# Patient Record
(demographics unavailable — no encounter records)

---

## 2024-11-26 NOTE — BEGINNING OF VISIT
[Mother] : mother [] :  [[employer] ] : provided by [employer]   [Time Spent: ____ minutes] : Total time spent using  services: [unfilled] minutes. The patient's primary language is not English thus required  services. [TWNoteComboBox1] : [place]

## 2024-11-26 NOTE — PHYSICAL EXAM
[Alert] : alert [Normocephalic] : normocephalic [Flat Open Anterior [place]] : flat open anterior fontanelle [Red Reflex] : red reflex bilateral [PERRL] : PERRL [Normally Placed Ears] : normally placed ears [Auricles Well Formed] : auricles well formed [Clear Tympanic membranes] : clear tympanic membranes [Light reflex present] : light reflex present [Bony landmarks visible] : bony landmarks visible [Nares Patent] : nares patent [Palate Intact] : palate intact [Uvula Midline] : uvula midline [Supple, full passive range of motion] : supple, full passive range of motion [Symmetric Chest Rise] : symmetric chest rise [Clear to Auscultation Bilaterally] : clear to auscultation bilaterally [Regular Rate and Rhythm] : regular rate and rhythm [S1, S2 present] : S1, S2 present [+2 Femoral Pulses] : (+) 2 femoral pulses [Soft] : soft [Bowel Sounds] : bowel sounds present [Normal External Genitalia] : normal external genitalia [Normal Vaginal Introitus] : normal vaginal introitus [Patent] : patent [Normally Placed] : normally placed [No Abnormal Lymph Nodes Palpated] : no abnormal lymph nodes palpated [Symmetric Buttocks Creases] : symmetric buttocks creases [Plantar Grasp] : plantar grasp reflex present [Cranial Nerves Grossly Intact] : cranial nerves grossly intact [Acute Distress] : no acute distress [Discharge] : no discharge [Tooth Eruption] : no tooth eruption [Palpable Masses] : no palpable masses [Murmurs] : no murmurs [Tender] : nontender [Distended] : nondistended [Hepatomegaly] : no hepatomegaly [Splenomegaly] : no splenomegaly [Clitoromegaly] : no clitoromegaly [Garcia-Ortolani] : negative Garcia-Ortolani [Allis Sign] : negative Allis sign [Spinal Dimple] : no spinal dimple [Tuft of Hair] : no tuft of hair [Rash or Lesions] : no rash/lesions

## 2024-11-26 NOTE — COUNSELING
[Use of Plain Language] : use of plain language [Adequate] : adequate [None] : none [] : I have reviewed management goals with caretaker and provided a copy of care plan [FreeTextEntry3] :  used for appt

## 2024-11-26 NOTE — DEVELOPMENTAL MILESTONES
[Normal Development] : Normal Development [None] : none [Pats or smiles at reflection] : pats or smiles at reflection [Begins to turn when name called] : begins to turn when name called [Babbles] : babbles [Rolls over prone to supine] : rolls over prone to supine [Sits briefly without support] : sits briefly without support [Reaches for object and transfers] : reaches for object and transfers [Rakes small object with 4 fingers] : rakes small object with 4 fingers [[place] small object on surface] : bangs small object on surface [Passed] : passed [Yes] : Completed. [FreeTextEntry2] : 1

## 2024-11-26 NOTE — DISCUSSION/SUMMARY
[Normal Growth] : growth [Normal Development] : development [None] : No medical problems [No Elimination Concerns] : elimination [No Feeding Concerns] : feeding [No Skin Concerns] : skin [Normal Sleep Pattern] : sleep [Term Infant] : Term infant [Family Functioning] : family functioning [Nutrition and Feeding] : nutrition and feeding [Infant Development] : infant development [Oral Health] : oral health [Safety] : safety [No Medications] : ~He/She~ is not on any medications [Parent/Guardian] : parent/guardian [] : The components of the vaccine(s) to be administered today are listed in the plan of care. The disease(s) for which the vaccine(s) are intended to prevent and the risks have been discussed with the caretaker.  The risks are also included in the appropriate vaccination information statements which have been provided to the patient's caregiver.  The caregiver has given consent to vaccinate. 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 Patient is developmentally normal/slightly ahead on milestones. Anticipatory guidance was given for what to expect in the coming 6 mos. However, patient will return in 1 mo for the 2nd flu vaccine. Patient has mild cold symptoms, but nothing concerning for severe infection and she has had no fevers. Patient received both her routine vaccines as well as a flu shot today.

## 2024-11-26 NOTE — HISTORY OF PRESENT ILLNESS
[Mother] : mother [Formula ___ oz/feed] : [unfilled] oz of formula per feed [Formula ___ oz in 24hrs] : [unfilled] oz of formula in 24 hours [Normal] : Normal [In Bassinet/Crib] : sleeps in bassinet/crib [On back] : sleeps on back [Co-sleeping] : co-sleeping [Sleeps 12-16 hours per 24 hours (including naps)] : sleeps 12-16 hours per 24 hours (including naps) [Pacifier use] : Pacifier use [Tummy time] : tummy time [Screen time only for video chatting] : screen time only for video chatting [No] : No cigarette smoke exposure [Water heater temperature set at <120 degrees F] : Water heater temperature set at <120 degrees F [Rear facing car seat in back seat] : Rear facing car seat in back seat [Carbon Monoxide Detectors] : Carbon monoxide detectors at home [Smoke Detectors] : Smoke detectors at home. [NO] : No [Loose bedding, pillow, toys, and/or bumpers in crib] : no loose bedding, pillow, toys, and/or bumpers in crib [de-identified] : Patient has been well. She still has mild drainage from the R lacrimal duct and today developed a mild cough and congestion [de-identified] : Mild drainage/tears from R eye and mild cold sx  [de-identified] : Up to date.  [FreeTextEntry1] : Patient is a healthy 6mo F. She is able to sit, use her hands to play with baby toys, is pulling to stand, and per MOC starting to crawl. She also moves her eyes with verbal and visual cues. She is starting to say mama and lisa.  She is still only feeding formula, but patient's mom plans to introduce solid foods within this month. Patient has 5 diapers with urine and 1 with stool each day. She takes 5 oz of formula/feed and feeds 6x/day. She sleeps well at night and takes a nap during the day. She has minimal screen time. 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 She has no associated conjunctivitis. She has mild nasal congestion and per MOC coughed a few times today, but has otherwise been healthy since birth.

## 2024-11-26 NOTE — CARE PLAN
[Care Plan reviewed and provided to patient/caregiver] : Care plan reviewed and provided to patient/caregiver [Understands and communicates without difficulty] : Patient/Caregiver understands and communicates without difficulty [FreeTextEntry2] : Patient's mom should continue to foster healthy development. Patient lives with both parents and 2 healthy older siblings. Patient's mom had no developmental concerns. She was concerned about eye drainage 2/2 nasolacrimal duct stenosis. Nasolacrimal massage was discussed and demonstrated and patient's mom was reassured that condition should self-resolve.  [FreeTextEntry3] : Patient was given 6mo vaccine and the flue shot. Patient will return next month for the 2nd dose of the flu shot and at 9 mos for developmental screening and CBC.

## 2025-03-12 NOTE — DISCUSSION/SUMMARY
[FreeTextEntry1] : 9M old here for WCC, no concerns MOC reports started to walk last month CBC, lead, G6PD ordered Fluzone #2 administered RTC in 3 months for 12M WCC/PRN  HM Continue breastmilk or formula as desired. Increase table foods, 3 meals with 2-3 snacks per day. Incorporate up to 6 oz of flourinated water daily in a sippy cup. Discussed weaning of bottle and pacifier. Wipe teeth daily with washcloth. When in car, patient should be in rear-facing car seat in back seat. Put baby to sleep in own crib with no loose or soft bedding. Lower crib matress. Help baby to maintain consistent daily routines and sleep schedule. Recognize stranger anxiety. Ensure home is safe since baby is increasingly mobile. Be within arm's reach of baby at all times. Use consistent, positive discipline. Avoid screen time. Read aloud to baby.

## 2025-03-12 NOTE — PHYSICAL EXAM
[Alert] : alert [Normocephalic] : normocephalic [Flat Open Anterior [place]] : flat open anterior fontanelle [Red Reflex] : red reflex bilateral [PERRL] : PERRL [Normally Placed Ears] : normally placed ears [Auricles Well Formed] : auricles well formed [Clear Tympanic membranes] : clear tympanic membranes [Light reflex present] : light reflex present [Bony landmarks visible] : bony landmarks visible [Nares Patent] : nares patent [Palate Intact] : palate intact [Uvula Midline] : uvula midline [Supple, full passive range of motion] : supple, full passive range of motion [Symmetric Chest Rise] : symmetric chest rise [Clear to Auscultation Bilaterally] : clear to auscultation bilaterally [Regular Rate and Rhythm] : regular rate and rhythm [S1, S2 present] : S1, S2 present [+2 Femoral Pulses] : (+) 2 femoral pulses [Soft] : soft [Bowel Sounds] : bowel sounds present [Normal External Genitalia] : normal external genitalia [Normal Vaginal Introitus] : normal vaginal introitus [No Abnormal Lymph Nodes Palpated] : no abnormal lymph nodes palpated [Symmetric abduction and rotation of hips] : symmetric abduction and rotation of hips [Straight] : straight [Cranial Nerves Grossly Intact] : cranial nerves grossly intact [Acute Distress] : no acute distress [Excessive Tearing] : no excessive tearing [Discharge] : no discharge [Palpable Masses] : no palpable masses [Murmurs] : no murmurs [Tender] : nontender [Distended] : nondistended [Hepatomegaly] : no hepatomegaly [Splenomegaly] : no splenomegaly [Clitoromegaly] : no clitoromegaly [Allis Sign] : negative Allis sign [Rash or Lesions] : no rash/lesions

## 2025-03-12 NOTE — BEGINNING OF VISIT
[Mother] : mother [] :  [[employer] ] : provided by [employer]   [Interpreters_IDNumber] : [id number] [Interpreters_FullName] : Madelyn

## 2025-03-12 NOTE — DEVELOPMENTAL MILESTONES
[Normal Development] : Normal Development [None] : none [Uses basic gestures] : uses basic gestures [Says "Rhys" or "Mama"] : says "Rhys" or "Mama" nonspecifically [Sits well without support] : sits well without support [Transitions between sitting and lying] : transitions between sitting and lying [Balances on hands and knees] : balances on hands and knees [Crawls] : crawls [Picks up small objects with 3 fingers] : picks up small objects with 3 fingers and thumb [Releases objects intentionally] : releases objects intentionally [[place] objects together] : bangs objects together [Yes] : Completed.

## 2025-03-12 NOTE — HISTORY OF PRESENT ILLNESS
[Mother] : mother [Formula ___ oz/feed] : [unfilled] oz of formula per feed [Fruit] : fruit [Vegetables] : vegetables [Cereal] : cereal [Meat] : meat [Eggs] : eggs [Fish] : fish [Dairy] : dairy [Baby food] : baby food [Finger foods] : finger foods [Water] : water [Normal] : Normal [Frequency of stools: ___] : Frequency of stools: [unfilled]  stools [In Crib] : sleeps in crib [On back] : sleeps on back [Sleeps 12-16 hours per 24 hours (including naps)] : sleeps 12-16 hours per 24 hours (including naps) [Brushing teeth] : brushing teeth [Peanut] : no peanut [Co-sleeping] : no co-sleeping [Wakes up at night] : does not wake up at night [Loose bedding, pillow, toys, and/or bumpers in crib] : no loose bedding, pillow, toys, and/or bumpers in crib [de-identified] :  [id number], Madelyn [de-identified] : none [FreeTextEntry1] : no concerns for todays visit

## 2025-05-29 NOTE — HISTORY OF PRESENT ILLNESS
[Mother] : mother [Cow's milk ___ oz/feed] : [unfilled] oz of Cow's milk per feed [Fruit] : fruit [Vegetables] : vegetables [Meat] : meat [Dairy] : dairy [Table food] : table food [___ stools per day] : [unfilled]  stools per day [___ voids per day] : [unfilled] voids per day [Normal] : Normal [In crib] : In crib [Sippy cup use] : Sippy cup use [Brushing teeth] : Brushing teeth [No] : Not at  exposure [Smoke Detectors] : Smoke detectors [Carbon Monoxide Detectors] : Carbon monoxide detectors [NO] : No [Exposure to electronic nicotine delivery system] : No exposure to electronic nicotine delivery system [FreeTextEntry1] : 12 mo WCC  Concerns:  3 days of fever and congestion, Tactile temp, didn't check. Last tactile fever yesterday during the day. No cough. Only congestion. No vomiting/diarrhea. Dec solids but good fluids, normal voids. No inc WOB

## 2025-05-29 NOTE — PHYSICAL EXAM
[Alert] : alert [Normocephalic] : normocephalic [Closed Anterior [place]] : closed anterior fontanelle [Red Reflex] : red reflex bilateral [PERRL] : PERRL [Normally Placed Ears] : normally placed ears [Auricles Well Formed] : auricles well formed [Clear Tympanic membranes] : clear tympanic membranes [Light reflex present] : light reflex present [Bony landmarks visible] : bony landmarks visible [Nares Patent] : nares patent [Palate Intact] : palate intact [Uvula Midline] : uvula midline [Tooth Eruption] : tooth eruption [Supple, full passive range of motion] : supple, full passive range of motion [Symmetric Chest Rise] : symmetric chest rise [Clear to Auscultation Bilaterally] : clear to auscultation bilaterally [Regular Rate and Rhythm] : regular rate and rhythm [S1, S2 present] : S1, S2 present [+2 Femoral Pulses] : (+) 2 femoral pulses [Soft] : soft [Bowel Sounds] : normoactive bowel sounds [Normal External Genitalia] : normal external genitalia [Normal Vaginal Introitus] : normal vaginal introitus [No Abnormal Lymph Nodes Palpated] : no abnormal lymph nodes palpated [Symmetric Abduction and Rotation of Hips] : symmetric abduction and rotation of hips [Straight] : straight [Cranial Nerves Grossly Intact] : cranial nerves grossly intact [Discharge] : no discharge [Palpable Masses] : no palpable masses [Murmurs] : no murmurs [Tender] : nontender [Distended] : nondistended [Hepatomegaly] : no hepatomegaly [Splenomegaly] : no splenomegaly [Clitoromegaly] : no clitoromegaly [Allis Sign] : negative Allis sign [Rash or Lesions] : no rash/lesions

## 2025-05-29 NOTE — BEGINNING OF VISIT
[Mother] : mother [] :  [[employer] ] : provided by [employer]   [Time Spent: ____ minutes] : Total time spent using  services: [unfilled] minutes. The patient's primary language is not English thus required  services. [Interpreters_IDNumber] : [id number] [Interpreters_FullName] : Tessie

## 2025-05-29 NOTE — DISCUSSION/SUMMARY
[Normal Growth] : growth [Normal Development] : development [Family Support] : family support [Establishing Routines] : establishing routines [Feeding and Appetite Changes] : feeding and appetite changes [Establishing A Dental Home] : establishing a dental home [Safety] : safety [FreeTextEntry1] : 12 mo C. Growing and developing well - 12 mo vaccines - anticipatory guidance, summer safety - dec milk to 18 oz/day and switch to sippy cup  Viral URI- Lungs and TMs clear, afebrile here - supportive care - explained why no abx at this time - afebrile so will give vaccines - RTC if dec fluids/UOP, inc WOB, high persistent fevers